# Patient Record
Sex: MALE | Race: WHITE | NOT HISPANIC OR LATINO | Employment: UNEMPLOYED | URBAN - NONMETROPOLITAN AREA
[De-identification: names, ages, dates, MRNs, and addresses within clinical notes are randomized per-mention and may not be internally consistent; named-entity substitution may affect disease eponyms.]

---

## 2020-03-10 ENCOUNTER — HOSPITAL ENCOUNTER (INPATIENT)
Facility: HOSPITAL | Age: 50
LOS: 5 days | Discharge: HOME | End: 2020-03-15
Attending: PSYCHIATRY & NEUROLOGY | Admitting: PSYCHIATRY & NEUROLOGY

## 2020-03-10 ENCOUNTER — HOSPITAL ENCOUNTER (EMERGENCY)
Facility: HOSPITAL | Age: 50
Discharge: STILL ADMITTED OR ADMINISTRATIVE | End: 2020-03-10
Attending: EMERGENCY MEDICINE

## 2020-03-10 VITALS
WEIGHT: 200 LBS | OXYGEN SATURATION: 96 % | HEIGHT: 70 IN | BODY MASS INDEX: 28.63 KG/M2 | DIASTOLIC BLOOD PRESSURE: 85 MMHG | SYSTOLIC BLOOD PRESSURE: 137 MMHG | RESPIRATION RATE: 18 BRPM | TEMPERATURE: 98.4 F | HEART RATE: 85 BPM

## 2020-03-10 DIAGNOSIS — F10.10 ALCOHOL ABUSE: Primary | ICD-10-CM

## 2020-03-10 PROBLEM — F19.20 CHEMICAL DEPENDENCY (HCC): Status: ACTIVE | Noted: 2020-03-10

## 2020-03-10 LAB
6-ACETYL MORPHINE: NEGATIVE
ALBUMIN SERPL-MCNC: 5.08 G/DL (ref 3.5–5.2)
ALBUMIN/GLOB SERPL: 1.7 G/DL
ALP SERPL-CCNC: 112 U/L (ref 39–117)
ALT SERPL W P-5'-P-CCNC: 17 U/L (ref 1–41)
AMPHET+METHAMPHET UR QL: NEGATIVE
ANION GAP SERPL CALCULATED.3IONS-SCNC: 14 MMOL/L (ref 5–15)
AST SERPL-CCNC: 20 U/L (ref 1–40)
BARBITURATES UR QL SCN: NEGATIVE
BASOPHILS # BLD AUTO: 0.07 10*3/MM3 (ref 0–0.2)
BASOPHILS NFR BLD AUTO: 0.5 % (ref 0–1.5)
BENZODIAZ UR QL SCN: NEGATIVE
BILIRUB SERPL-MCNC: 0.4 MG/DL (ref 0.2–1.2)
BILIRUB UR QL STRIP: NEGATIVE
BUN BLD-MCNC: 10 MG/DL (ref 6–20)
BUN/CREAT SERPL: 11.2 (ref 7–25)
BUPRENORPHINE SERPL-MCNC: NEGATIVE NG/ML
CALCIUM SPEC-SCNC: 9.4 MG/DL (ref 8.6–10.5)
CANNABINOIDS SERPL QL: POSITIVE
CHLORIDE SERPL-SCNC: 97 MMOL/L (ref 98–107)
CLARITY UR: CLEAR
CO2 SERPL-SCNC: 25 MMOL/L (ref 22–29)
COCAINE UR QL: NEGATIVE
COLOR UR: YELLOW
CREAT BLD-MCNC: 0.89 MG/DL (ref 0.76–1.27)
DEPRECATED RDW RBC AUTO: 45.3 FL (ref 37–54)
DEPRECATED RDW RBC AUTO: 47.3 FL (ref 37–54)
EOSINOPHIL # BLD AUTO: 0.46 10*3/MM3 (ref 0–0.4)
EOSINOPHIL # BLD MANUAL: 0.55 10*3/MM3 (ref 0–0.4)
EOSINOPHIL NFR BLD AUTO: 3 % (ref 0.3–6.2)
EOSINOPHIL NFR BLD MANUAL: 4 % (ref 0.3–6.2)
ERYTHROCYTE [DISTWIDTH] IN BLOOD BY AUTOMATED COUNT: 13.2 % (ref 12.3–15.4)
ERYTHROCYTE [DISTWIDTH] IN BLOOD BY AUTOMATED COUNT: 13.3 % (ref 12.3–15.4)
ETHANOL BLD-MCNC: <10 MG/DL (ref 0–10)
ETHANOL UR QL: <0.01 %
GFR SERPL CREATININE-BSD FRML MDRD: 90 ML/MIN/1.73
GLOBULIN UR ELPH-MCNC: 3 GM/DL
GLUCOSE BLD-MCNC: 102 MG/DL (ref 65–99)
GLUCOSE UR STRIP-MCNC: NEGATIVE MG/DL
HCT VFR BLD AUTO: 45.2 % (ref 37.5–51)
HCT VFR BLD AUTO: 46.3 % (ref 37.5–51)
HGB BLD-MCNC: 14.5 G/DL (ref 13–17.7)
HGB BLD-MCNC: 15 G/DL (ref 13–17.7)
HGB UR QL STRIP.AUTO: NEGATIVE
IMM GRANULOCYTES # BLD AUTO: 0.08 10*3/MM3 (ref 0–0.05)
IMM GRANULOCYTES NFR BLD AUTO: 0.5 % (ref 0–0.5)
KETONES UR QL STRIP: NEGATIVE
LEUKOCYTE ESTERASE UR QL STRIP.AUTO: NEGATIVE
LYMPHOCYTES # BLD AUTO: 4.4 10*3/MM3 (ref 0.7–3.1)
LYMPHOCYTES # BLD MANUAL: 4.96 10*3/MM3 (ref 0.7–3.1)
LYMPHOCYTES NFR BLD AUTO: 28.5 % (ref 19.6–45.3)
LYMPHOCYTES NFR BLD MANUAL: 36 % (ref 19.6–45.3)
LYMPHOCYTES NFR BLD MANUAL: 8 % (ref 5–12)
MAGNESIUM SERPL-MCNC: 1.9 MG/DL (ref 1.6–2.6)
MCH RBC QN AUTO: 30.1 PG (ref 26.6–33)
MCH RBC QN AUTO: 30.7 PG (ref 26.6–33)
MCHC RBC AUTO-ENTMCNC: 32.1 G/DL (ref 31.5–35.7)
MCHC RBC AUTO-ENTMCNC: 32.4 G/DL (ref 31.5–35.7)
MCV RBC AUTO: 92.8 FL (ref 79–97)
MCV RBC AUTO: 95.6 FL (ref 79–97)
METHADONE UR QL SCN: NEGATIVE
MONOCYTES # BLD AUTO: 1.07 10*3/MM3 (ref 0.1–0.9)
MONOCYTES # BLD AUTO: 1.1 10*3/MM3 (ref 0.1–0.9)
MONOCYTES NFR BLD AUTO: 6.9 % (ref 5–12)
NEUTROPHILS # BLD AUTO: 7.17 10*3/MM3 (ref 1.7–7)
NEUTROPHILS # BLD AUTO: 9.34 10*3/MM3 (ref 1.7–7)
NEUTROPHILS NFR BLD AUTO: 60.6 % (ref 42.7–76)
NEUTROPHILS NFR BLD MANUAL: 52 % (ref 42.7–76)
NITRITE UR QL STRIP: NEGATIVE
NRBC BLD AUTO-RTO: 0 /100 WBC (ref 0–0.2)
OPIATES UR QL: NEGATIVE
OXYCODONE UR QL SCN: NEGATIVE
PCP UR QL SCN: NEGATIVE
PH UR STRIP.AUTO: 6 [PH] (ref 5–8)
PLAT MORPH BLD: NORMAL
PLATELET # BLD AUTO: 281 10*3/MM3 (ref 140–450)
PLATELET # BLD AUTO: 297 10*3/MM3 (ref 140–450)
PMV BLD AUTO: 10.6 FL (ref 6–12)
PMV BLD AUTO: 10.9 FL (ref 6–12)
POTASSIUM BLD-SCNC: 3.6 MMOL/L (ref 3.5–5.2)
PROT SERPL-MCNC: 8.1 G/DL (ref 6–8.5)
PROT UR QL STRIP: NEGATIVE
RBC # BLD AUTO: 4.73 10*6/MM3 (ref 4.14–5.8)
RBC # BLD AUTO: 4.99 10*6/MM3 (ref 4.14–5.8)
RBC MORPH BLD: NORMAL
SCAN SLIDE: NORMAL
SODIUM BLD-SCNC: 136 MMOL/L (ref 136–145)
SP GR UR STRIP: <=1.005 (ref 1–1.03)
UROBILINOGEN UR QL STRIP: NORMAL
WBC NRBC COR # BLD: 13.79 10*3/MM3 (ref 3.4–10.8)
WBC NRBC COR # BLD: 15.42 10*3/MM3 (ref 3.4–10.8)

## 2020-03-10 PROCEDURE — 83735 ASSAY OF MAGNESIUM: CPT | Performed by: PHYSICIAN ASSISTANT

## 2020-03-10 PROCEDURE — 93005 ELECTROCARDIOGRAM TRACING: CPT | Performed by: PSYCHIATRY & NEUROLOGY

## 2020-03-10 PROCEDURE — 80307 DRUG TEST PRSMV CHEM ANLYZR: CPT | Performed by: PHYSICIAN ASSISTANT

## 2020-03-10 PROCEDURE — 85025 COMPLETE CBC W/AUTO DIFF WBC: CPT | Performed by: PHYSICIAN ASSISTANT

## 2020-03-10 PROCEDURE — 85007 BL SMEAR W/DIFF WBC COUNT: CPT | Performed by: PSYCHIATRY & NEUROLOGY

## 2020-03-10 PROCEDURE — 81003 URINALYSIS AUTO W/O SCOPE: CPT | Performed by: PHYSICIAN ASSISTANT

## 2020-03-10 PROCEDURE — 80053 COMPREHEN METABOLIC PANEL: CPT | Performed by: PHYSICIAN ASSISTANT

## 2020-03-10 PROCEDURE — 85025 COMPLETE CBC W/AUTO DIFF WBC: CPT | Performed by: PSYCHIATRY & NEUROLOGY

## 2020-03-10 PROCEDURE — 93010 ELECTROCARDIOGRAM REPORT: CPT | Performed by: INTERNAL MEDICINE

## 2020-03-10 PROCEDURE — HZ2ZZZZ DETOXIFICATION SERVICES FOR SUBSTANCE ABUSE TREATMENT: ICD-10-PCS | Performed by: PSYCHIATRY & NEUROLOGY

## 2020-03-10 RX ORDER — ECHINACEA PURPUREA EXTRACT 125 MG
2 TABLET ORAL AS NEEDED
Status: DISCONTINUED | OUTPATIENT
Start: 2020-03-10 | End: 2020-03-15 | Stop reason: HOSPADM

## 2020-03-10 RX ORDER — ONDANSETRON 4 MG/1
4 TABLET, FILM COATED ORAL EVERY 6 HOURS PRN
Status: DISCONTINUED | OUTPATIENT
Start: 2020-03-10 | End: 2020-03-15 | Stop reason: HOSPADM

## 2020-03-10 RX ORDER — LORAZEPAM 0.5 MG/1
0.5 TABLET ORAL EVERY 4 HOURS PRN
Status: ACTIVE | OUTPATIENT
Start: 2020-03-14 | End: 2020-03-15

## 2020-03-10 RX ORDER — LORAZEPAM 1 MG/1
1 TABLET ORAL
Status: COMPLETED | OUTPATIENT
Start: 2020-03-13 | End: 2020-03-13

## 2020-03-10 RX ORDER — RISPERIDONE 0.5 MG/1
0.5 TABLET ORAL NIGHTLY
COMMUNITY
End: 2020-03-15 | Stop reason: HOSPADM

## 2020-03-10 RX ORDER — ACETAMINOPHEN 325 MG/1
650 TABLET ORAL EVERY 6 HOURS PRN
Status: DISCONTINUED | OUTPATIENT
Start: 2020-03-10 | End: 2020-03-15 | Stop reason: HOSPADM

## 2020-03-10 RX ORDER — RAMIPRIL 10 MG/1
10 CAPSULE ORAL DAILY
Status: DISCONTINUED | OUTPATIENT
Start: 2020-03-11 | End: 2020-03-15 | Stop reason: HOSPADM

## 2020-03-10 RX ORDER — BENZTROPINE MESYLATE 1 MG/1
2 TABLET ORAL ONCE AS NEEDED
Status: DISCONTINUED | OUTPATIENT
Start: 2020-03-10 | End: 2020-03-15 | Stop reason: HOSPADM

## 2020-03-10 RX ORDER — HYDROXYZINE 50 MG/1
25 TABLET, FILM COATED ORAL 2 TIMES DAILY PRN
Status: CANCELLED | OUTPATIENT
Start: 2020-03-10

## 2020-03-10 RX ORDER — BENZTROPINE MESYLATE 1 MG/ML
1 INJECTION INTRAMUSCULAR; INTRAVENOUS ONCE AS NEEDED
Status: DISCONTINUED | OUTPATIENT
Start: 2020-03-10 | End: 2020-03-15 | Stop reason: HOSPADM

## 2020-03-10 RX ORDER — FAMOTIDINE 20 MG/1
20 TABLET, FILM COATED ORAL 2 TIMES DAILY PRN
Status: DISCONTINUED | OUTPATIENT
Start: 2020-03-10 | End: 2020-03-15 | Stop reason: HOSPADM

## 2020-03-10 RX ORDER — SERTRALINE HYDROCHLORIDE 25 MG/1
25 TABLET, FILM COATED ORAL DAILY
COMMUNITY
End: 2020-03-15 | Stop reason: HOSPADM

## 2020-03-10 RX ORDER — HYDROXYZINE 50 MG/1
50 TABLET, FILM COATED ORAL EVERY 6 HOURS PRN
Status: DISCONTINUED | OUTPATIENT
Start: 2020-03-10 | End: 2020-03-15 | Stop reason: HOSPADM

## 2020-03-10 RX ORDER — RAMIPRIL 10 MG/1
10 CAPSULE ORAL DAILY
COMMUNITY

## 2020-03-10 RX ORDER — HYDROXYZINE PAMOATE 25 MG/1
25 CAPSULE ORAL 2 TIMES DAILY PRN
COMMUNITY

## 2020-03-10 RX ORDER — SERTRALINE HYDROCHLORIDE 25 MG/1
25 TABLET, FILM COATED ORAL DAILY
Status: DISCONTINUED | OUTPATIENT
Start: 2020-03-11 | End: 2020-03-11

## 2020-03-10 RX ORDER — ALUMINA, MAGNESIA, AND SIMETHICONE 2400; 2400; 240 MG/30ML; MG/30ML; MG/30ML
15 SUSPENSION ORAL EVERY 6 HOURS PRN
Status: DISCONTINUED | OUTPATIENT
Start: 2020-03-10 | End: 2020-03-15 | Stop reason: HOSPADM

## 2020-03-10 RX ORDER — TRAZODONE HYDROCHLORIDE 50 MG/1
50 TABLET ORAL NIGHTLY PRN
Status: DISCONTINUED | OUTPATIENT
Start: 2020-03-10 | End: 2020-03-15 | Stop reason: HOSPADM

## 2020-03-10 RX ORDER — BENZONATATE 100 MG/1
100 CAPSULE ORAL 3 TIMES DAILY PRN
Status: DISCONTINUED | OUTPATIENT
Start: 2020-03-10 | End: 2020-03-15 | Stop reason: HOSPADM

## 2020-03-10 RX ORDER — IBUPROFEN 400 MG/1
400 TABLET ORAL EVERY 6 HOURS PRN
Status: DISCONTINUED | OUTPATIENT
Start: 2020-03-10 | End: 2020-03-15 | Stop reason: HOSPADM

## 2020-03-10 RX ORDER — LORAZEPAM 2 MG/1
2 TABLET ORAL
Status: COMPLETED | OUTPATIENT
Start: 2020-03-11 | End: 2020-03-11

## 2020-03-10 RX ORDER — LORAZEPAM 0.5 MG/1
0.5 TABLET ORAL
Status: COMPLETED | OUTPATIENT
Start: 2020-03-14 | End: 2020-03-14

## 2020-03-10 RX ORDER — LORAZEPAM 2 MG/1
2 TABLET ORAL EVERY 4 HOURS PRN
Status: ACTIVE | OUTPATIENT
Start: 2020-03-11 | End: 2020-03-12

## 2020-03-10 RX ORDER — LORAZEPAM 1 MG/1
1 TABLET ORAL EVERY 4 HOURS PRN
Status: ACTIVE | OUTPATIENT
Start: 2020-03-13 | End: 2020-03-14

## 2020-03-10 RX ORDER — LORAZEPAM 2 MG/1
2 TABLET ORAL
Status: ACTIVE | OUTPATIENT
Start: 2020-03-10 | End: 2020-03-11

## 2020-03-10 RX ORDER — RISPERIDONE 0.25 MG/1
0.5 TABLET ORAL NIGHTLY
Status: DISCONTINUED | OUTPATIENT
Start: 2020-03-10 | End: 2020-03-11

## 2020-03-10 RX ORDER — LOPERAMIDE HYDROCHLORIDE 2 MG/1
2 CAPSULE ORAL
Status: DISCONTINUED | OUTPATIENT
Start: 2020-03-10 | End: 2020-03-15 | Stop reason: HOSPADM

## 2020-03-10 RX ADMIN — TRAZODONE HYDROCHLORIDE 50 MG: 50 TABLET ORAL at 21:18

## 2020-03-10 RX ADMIN — RISPERIDONE 0.5 MG: 0.25 TABLET, FILM COATED ORAL at 21:18

## 2020-03-10 RX ADMIN — HYDROXYZINE HYDROCHLORIDE 50 MG: 50 TABLET ORAL at 21:18

## 2020-03-10 NOTE — ED PROVIDER NOTES
Subjective   49-year-old male presents secondary to need for detox from alcohol.  Patient states that he was previously drinking heavily.  He states that recently has not been able to afford to drink very often and drinks 6 beers a day when he can get them.  He is never had any withdrawal signs or symptoms.  No hallucinations or seizures.  States he has had 1 beer today.  He also smokes marijuana from time to time.  He denies any suicidal homicidal ideation.  No medical complaints.  No other complaints at this time.          Review of Systems   Constitutional: Negative.  Negative for fever.   HENT: Negative.    Respiratory: Negative.    Cardiovascular: Negative.  Negative for chest pain.   Gastrointestinal: Negative.  Negative for abdominal pain.   Endocrine: Negative.    Genitourinary: Negative.  Negative for dysuria.   Skin: Negative.    Neurological: Negative.    Psychiatric/Behavioral: Negative.    All other systems reviewed and are negative.      Past Medical History:   Diagnosis Date   • Anxiety    • Depression    • Hypertension        No Known Allergies    No past surgical history on file.    History reviewed. No pertinent family history.    Social History     Socioeconomic History   • Marital status: Single     Spouse name: Not on file   • Number of children: Not on file   • Years of education: Not on file   • Highest education level: Not on file   Tobacco Use   • Smoking status: Current Every Day Smoker     Packs/day: 2.00     Years: 40.00     Pack years: 80.00     Types: Cigarettes   Substance and Sexual Activity   • Alcohol use: Yes     Alcohol/week: 12.0 - 30.0 standard drinks     Types: 12 - 30 Cans of beer per week   • Drug use: Yes     Types: Marijuana   • Sexual activity: Not Currently     Partners: Female           Objective   Physical Exam   Constitutional: He is oriented to person, place, and time. He appears well-developed and well-nourished. No distress.   HENT:   Head: Normocephalic and  atraumatic.   Right Ear: External ear normal.   Left Ear: External ear normal.   Nose: Nose normal.   Eyes: Pupils are equal, round, and reactive to light. Conjunctivae and EOM are normal.   Neck: Normal range of motion. Neck supple. No JVD present. No tracheal deviation present.   Cardiovascular: Normal rate, regular rhythm and normal heart sounds.   No murmur heard.  Pulmonary/Chest: Effort normal and breath sounds normal. No respiratory distress. He has no wheezes.   Abdominal: Soft. Bowel sounds are normal. There is no tenderness.   Musculoskeletal: Normal range of motion. He exhibits no edema or deformity.   Neurological: He is alert and oriented to person, place, and time. No cranial nerve deficit.   Skin: Skin is warm and dry. No rash noted. He is not diaphoretic. No erythema. No pallor.   Psychiatric: He has a normal mood and affect. His behavior is normal. Thought content normal.   Nursing note and vitals reviewed.      Procedures           ED Course                                           MDM  Number of Diagnoses or Management Options  Alcohol abuse: new and requires workup     Amount and/or Complexity of Data Reviewed  Tests in the radiology section of CPT®: ordered and reviewed  Discuss the patient with other providers: yes    Risk of Complications, Morbidity, and/or Mortality  Presenting problems: moderate        Final diagnoses:   Alcohol abuse            Kaden Peterson PA  03/10/20 [id number]

## 2020-03-10 NOTE — NURSING NOTE
Full intake assessment completed awaiting Lab results.Full intake assessment completed awaiting Lab results.Nina presented to ED with a desire to detox from alcohol. Patient reported drinking  6-30 beers . Patient started drinking at 28  Problem at 43. Sobriety for 12 months from the time he started been drinking at this rate for 2-3 year patient CIWA 12. Patient rated depression 6/10 and anxiety 8/10. Patient reported stressors as Life. Patient reported poor sleep and appetite. Patient denies AVH.

## 2020-03-10 NOTE — NURSING NOTE
Called and provided all intake information including  abnormal lab and medical information to Dr Wood.Admit orders received.RBVOx2. Patient and ED provider aware.

## 2020-03-10 NOTE — NURSING NOTE
Patient offered food and drink as well as a bathroom. Patient safety maintained in accordance with Hospital policy.

## 2020-03-11 PROBLEM — I10 HYPERTENSION: Status: ACTIVE | Noted: 2020-03-11

## 2020-03-11 PROBLEM — F10.20 ALCOHOL USE DISORDER, SEVERE, DEPENDENCE (HCC): Status: ACTIVE | Noted: 2020-03-10

## 2020-03-11 PROBLEM — F41.9 ANXIETY: Status: ACTIVE | Noted: 2020-03-11

## 2020-03-11 PROBLEM — G47.00 INSOMNIA: Status: ACTIVE | Noted: 2020-03-11

## 2020-03-11 PROBLEM — F32.A DEPRESSION: Status: ACTIVE | Noted: 2020-03-11

## 2020-03-11 PROCEDURE — 99223 1ST HOSP IP/OBS HIGH 75: CPT | Performed by: PSYCHIATRY & NEUROLOGY

## 2020-03-11 RX ADMIN — SERTRALINE 25 MG: 25 TABLET, FILM COATED ORAL at 08:28

## 2020-03-11 RX ADMIN — LORAZEPAM 2 MG: 2 TABLET ORAL at 15:10

## 2020-03-11 RX ADMIN — HYDROXYZINE HYDROCHLORIDE 50 MG: 50 TABLET ORAL at 21:21

## 2020-03-11 RX ADMIN — LORAZEPAM 2 MG: 2 TABLET ORAL at 21:21

## 2020-03-11 RX ADMIN — RAMIPRIL 10 MG: 10 CAPSULE ORAL at 08:28

## 2020-03-11 RX ADMIN — TRAZODONE HYDROCHLORIDE 50 MG: 50 TABLET ORAL at 21:21

## 2020-03-11 RX ADMIN — LORAZEPAM 2 MG: 2 TABLET ORAL at 08:28
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
## 2020-03-11 NOTE — PLAN OF CARE
Problem: Patient Care Overview  Goal: Plan of Care Review  Outcome: Ongoing (interventions implemented as appropriate)  Flowsheets (Taken 3/11/2020 1200)  Progress: improving  Plan of Care Reviewed With: grandchild(deisi)  Patient Agreement with Plan of Care: agrees  Note:   Patient denies suicidal or homicidal ideation . Patient denies hallucination. No delusional content noted. Patient reports withdrawal symptoms have improved. He says he plans to do to rehab facility at discharge.

## 2020-03-11 NOTE — PLAN OF CARE
Problem: Patient Care Overview  Goal: Plan of Care Review  Outcome: Ongoing (interventions implemented as appropriate)  Flowsheets (Taken 3/11/2020 6642)  Progress: improving  Patient Agreement with Plan of Care: agrees  Note:   PT new admit for pm shift. Slept well, participated in group, and appetite is good.

## 2020-03-11 NOTE — PROGRESS NOTES
Review of Systems   Constitutional: Negative.    HENT: Negative.    Eyes: Negative.    Respiratory: Negative.    Cardiovascular: Negative.    Gastrointestinal: Negative.    Endocrine: Negative.    Genitourinary: Negative.    Musculoskeletal: Negative.    Skin: Negative.    Allergic/Immunologic: Negative.    Neurological: Negative.    Hematological: Negative.    Psychiatric/Behavioral: Negative.        Physical Exam   Constitutional: oriented to person, place, and time. Appears well-developed and well-nourished.   HENT:   Head: Normocephalic and atraumatic.   Right Ear: External ear normal.   Left Ear: External ear normal.   Mouth/Throat: Oropharynx is clear and moist.   Eyes: Pupils are equal, round, and reactive to light. Conjunctivae and EOM are normal.   Neck: Normal range of motion. Neck supple.   Cardiovascular: Normal rate, regular rhythm and normal heart sounds.    Pulmonary/Chest: Effort normal and breath sounds normal. No respiratory distress. No wheezes.   Abdominal: Soft. Bowel sounds are normal.No distension. There is no tenderness.   Musculoskeletal: Normal range of motion. No edema or deformity.   Neurological:Alert and oriented to person, place, and time. No cranial nerve deficit. Coordination normal.   Skin: Skin is warm and dry. No rash noted. No erythema.     DATA  Labs reviewed  EKG reviewed  SCOTT reviewed  Record reviewed    DX    Alcohol use disorder, severe, dependence (CMS/HCC)  - Ativan detox      Depression  - Increase Zoloft      Anxiety  - Increase Zoloft      Insomnia  - Stop Risperdal  - Start Trazodone      Hypertension  - Ramipril      Tobacco use disorder  - Encouraged patient to quit

## 2020-03-11 NOTE — PLAN OF CARE
Problem: Patient Care Overview  Goal: Plan of Care Review  Flowsheets (Taken 3/11/2020 1437)  Progress: no change  Plan of Care Reviewed With: patient  Patient Agreement with Plan of Care: agrees  Outcome Summary: Completed initial assessment, discussed alternative aftercare resources and expectations of treatment; reviewed treatment plan.     Problem: Patient Care Overview  Goal: Individualization and Mutuality  Flowsheets (Taken 3/11/2020 1437)  Patient Personal Strengths: expressive of needs; expressive of emotions; interests/hobbies; resilient; resourceful; motivated for treatment  Patient Vulnerabilities: Ineffective coping skills, poor insight.  Patient Specific Goals (Include Timeframe): Patient to identify 3 relapse triggers, 3 healthy coping skills, complete relapse prevention plan, and complete detox regime.  Patient Specific Interventions: Patient to access psychiatric evaluation, medication management, individual and group CBT therapy sesions during admission.  What Information Would Help Us Give You More Personalized Care?: None  What Anxieties, Fears, Concerns, or Questions Do You Have About Your Care?: None  Patient Specific Preferences: Detox regime.     Problem: Patient Care Overview  Goal: Discharge Needs Assessment  Flowsheets (Taken 3/11/2020 1437)  Outpatient/Agency/Support Group Needs: residential services  Discharge Coordination/Progress: Patient anticipated to attend [facility] upon discharge.  He has insurance for medications.  Transportation Anticipated: public transportation; agency  Anticipated Discharge Disposition: home or self-care  Transportation Concerns: car, none  Current Discharge Risk: psychiatric illness; substance use/abuse  Concerns to be Addressed: medication; mental health; coping/stress; decision making; discharge planning; substance/tobacco abuse/use; financial/insurance  Readmission Within the Last 30 Days: no previous admission in last 30 days  Patient/Family  Anticipated Services at Transition: rehabilitation services  Patient's Choice of Community Agency(s): Anticipate [facility] referral.  Patient/Family Anticipates Transition to: inpatient rehabilitation facility     Problem: Patient Care Overview  Goal: Interprofessional Rounds/Family Conf  Flowsheets (Taken 3/11/2020 1437)  Participants: psychiatrist; nursing; social work; milieu/psych techs; other (see comments) (Navigator)  Summary: Treatment team staffing.     Problem: Overarching Goals (Adult)  Goal: Optimized Coping Skills in Response to Life Stressors  Intervention: Promote Effective Coping Strategies  Flowsheets (Taken 3/11/2020 1437)  Supportive Measures: active listening utilized; counseling provided; problem solving facilitated; verbalization of feelings encouraged     Problem: Overarching Goals (Adult)  Goal: Develops/Participates in Therapeutic [place] to Support Successful Transition  Intervention: Foster Therapeutic [place]  Flowsheets (Taken 3/11/2020 1437)  Trust Relationship/Rapport: care explained; choices provided; emotional support provided; questions answered; empathic listening provided; questions encouraged; reassurance provided; thoughts/feelings acknowledged     Problem: Overarching Goals (Adult)  Goal: Develops/Participates in Therapeutic [place] to Support Successful Transition  Intervention: Mutually Develop Transition Plan  Flowsheets (Taken 3/11/2020 1437)  Transition Support: community resources reviewed; crisis management plan promoted; follow-up care discussed     Problem: Impaired Control (Excessive Substance Use) (Adult)  Intervention: Promote Optimal Psychological Functioning  Flowsheets (Taken 3/11/2020 1437)  Mutually Determined Action Steps (Promote Optimal Psychological Functioning): discusses ongoing recovery plan; identifies support resources; identifies past trauma episode  Trust Relationship/Rapport: care explained; choices provided; emotional support provided;  "questions answered; empathic listening provided; questions encouraged; reassurance provided; thoughts/feelings acknowledged       DATA:         Therapist met individually with patient this date to introduce role and to discuss hospitalization expectations. Patient agreeable.      Rufino is a 49 year old  male [place].  He presents as voluntary admit with request to detox from alcohol.  He was referred to [facility] by [facility] recently, however he was declined for admission due to doctor's appointment.  He reports recently completing 24 days of rehab at [facility] and being discharged because he \"blew 2.5 on the breathalyzer\" and denies using any alcohol.  Patient is homeless for past year.  He reports he has been drinking up to 30 beers per day and drinking until he blacked out prior to attend rehab.  He reports never having withdrawal symptoms.  Patient reports only having headache and body aches.  He is admitted for detox regime and plans to attend [facility] upon discharge.      Patient consented for [facility].  Therapist sent referral and they report planning to accept patient on Monday.     Clinical Maneuvering/Intervention:     Therapist assisted patient in processing above session content; acknowledged and normalized patient’s thoughts, feelings, and concerns.  Discussed the therapist/patient relationship and explain the parameters and limitations of relative confidentiality.  Also discussed the importance of active participation, and honesty to the treatment process.  Encouraged the patient to discuss/vent their feelings, frustrations, and fears concerning their ongoing medical issues and validated their feelings.     Discussed the importance of finding enjoyable activities and coping skills that the patient can engage in a regular basis. Discussed healthy coping skills such as distraction, self love, grounding, thought challenges/reframing, etc.  Provided patient with " list of healthy coping skills this date. Discussed the importance of medication compliance.  Praised the patient for seeking help and spent the majority of the session building rapport.       Allowed patient to freely discuss issues without interruption or judgment. Provided safe, confidential environment to facilitate the development of positive therapeutic relationship and encourage open, honest communication.      Therapist addressed discharge safety planning this date. Assisted patient in identifying risk factors which would indicate the need for higher level of care after discharge;  including thoughts to harm self or others and/or self-harming behavior. Encouraged patient to call 911, or present to the nearest emergency room should any of these events occur. Discussed crisis intervention services and means to access.  Encouraged securing any objects of harm.       Therapist completed integrated summary, treatment plan, and initiated social history this date.  Therapist is strongly encouraging family involvement in treatment.       ASSESSMENT:      The patient displayed appropriate affect and anxious mood.  He denied SI, HI, and AVH.  Patient oriented x3 with poor insight and poor judgement.  He reported feeling fatigued.     PLAN:       Patient to remain hospitalized this date.     Treatment team will focus efforts on stabilizing patient's acute symptoms while providing education on healthy coping and crisis management to reduce hospitalizations.   Patient requires daily psychiatrist evaluation and 24/7 nursing supervision to promote patient  safety.     Therapist will offer 1-4 individual sessions, 1 therapy group daily, family education, and appropriate referral.    Therapist recommends rehab referral and patient consented for [facility], who state they accept patient on Monday.

## 2020-03-12 PROCEDURE — 99232 SBSQ HOSP IP/OBS MODERATE 35: CPT | Performed by: PSYCHIATRY & NEUROLOGY

## 2020-03-12 RX ADMIN — HYDROXYZINE HYDROCHLORIDE 50 MG: 50 TABLET ORAL at 21:17

## 2020-03-12 RX ADMIN — LORAZEPAM 1.5 MG: 1 TABLET ORAL at 15:37

## 2020-03-12 RX ADMIN — HYDROXYZINE HYDROCHLORIDE 50 MG: 50 TABLET ORAL at 08:50

## 2020-03-12 RX ADMIN — RAMIPRIL 10 MG: 10 CAPSULE ORAL at 08:47

## 2020-03-12 RX ADMIN — TRAZODONE HYDROCHLORIDE 50 MG: 50 TABLET ORAL at 21:17

## 2020-03-12 RX ADMIN — SERTRALINE 50 MG: 50 TABLET, FILM COATED ORAL at 08:47

## 2020-03-12 RX ADMIN — LORAZEPAM 1.5 MG: 1 TABLET ORAL at 21:17

## 2020-03-12 RX ADMIN — LORAZEPAM 1.5 MG: 1 TABLET ORAL at 08:48

## 2020-03-12 NOTE — DISCHARGE INSTR - APPOINTMENTS
[address] [facility] [place] [address]  [phone]    You are accepted on Monday, 03/16/20, for rehab.                                                                                                                                                                                                                                                                                                                                                                                                                                                                                                                                                                                                                                                                        Breanna Saucedo will be here for  today.

## 2020-03-12 NOTE — PLAN OF CARE
Problem: Patient Care Overview  Goal: Plan of Care Review  Outcome: Ongoing (interventions implemented as appropriate)  Flowsheets (Taken 3/12/2020 1902)  Progress: improving  Plan of Care Reviewed With: patient  Patient Agreement with Plan of Care: agrees

## 2020-03-12 NOTE — PROGRESS NOTES
"INPATIENT PSYCHIATRIC PROGRESS NOTE    Name:  Rufino Sim  :  1970  MRN:  [id number]  Visit Number:  [id number]  Length of stay:  2    SUBJECTIVE  CC/Focus of Exam: alcohol use    INTERVAL HISTORY:  The patient states he is feeling better and is not experiencing any cravings or withdrawals.   Depression rating 2/10  Anxiety rating 2/10  Sleep:good  Withdrawal sx: denies  Cravin/10    Review of Systems   Respiratory: Negative.    Cardiovascular: Negative.    Gastrointestinal: Negative.    Musculoskeletal: Negative.        OBJECTIVE    Temp:  [97.4 °F (36.3 °C)-98.1 °F (36.7 °C)] 97.5 °F (36.4 °C)  Heart Rate:  [63-87] 83  Resp:  [16-24] 24  BP: (104-150)/(60-83) 143/83    MENTAL STATUS EXAM:  Appearance:Casually dressed, good hygeine.   Cooperation:Cooperative  Psychomotor: No psychomotor agitation/retardation, No EPS, No motor tics  Speech-normal rate, amount.  Mood \"good\"   Affect-congruent, appropriate, stable  Thought Content-goal directed, no delusional material present  Thought process-linear, organized.  Suicidality: No SI  Homicidality: No HI  Perception: No AH/VH  Insight-fair   Judgement-fair    Lab Results (last 24 hours)     ** No results found for the last 24 hours. **             Imaging Results (Last 24 Hours)     ** No results found for the last 24 hours. **             ECG/EMG Results (most recent)     Procedure Component Value Units Date/Time    ECG 12 Lead [[id number]] Collected:  03/10/20 1943     Updated:  20    Narrative:       Test Reason : Baseline Cardiac Status  Blood Pressure : **/** mmHG  Vent. Rate : 061 BPM     Atrial Rate : 061 BPM     P-R Int : 148 ms          QRS Dur : 096 ms      QT Int : 402 ms       P-R-T Axes : 064 030 061 degrees     QTc Int : 404 ms    Normal sinus rhythm  Normal ECG  No previous ECGs available  Confirmed by Uriel Steward () on 3/12/2020 8:33:14 AM    Referred By:  LAITH           Confirmed By:Uriel Steward       "     ALLERGIES: Patient has no known allergies.      Current Facility-Administered Medications:   •  acetaminophen (TYLENOL) tablet 650 mg, 650 mg, Oral, Q6H PRN, Shonna Wood MD  •  aluminum-magnesium hydroxide-simethicone (MAALOX MAX) 400-400-40 MG/5ML suspension 15 mL, 15 mL, Oral, Q6H PRN, Shonna Wood MD  •  benzonatate (TESSALON) capsule 100 mg, 100 mg, Oral, TID PRN, Shonna Wood MD  •  benztropine (COGENTIN) tablet 2 mg, 2 mg, Oral, Once PRN **OR** benztropine (COGENTIN) injection 1 mg, 1 mg, Intramuscular, Once PRN, Shonna Wood MD  •  famotidine (PEPCID) tablet 20 mg, 20 mg, Oral, BID PRN, Shonna Wood MD  •  hydrOXYzine (ATARAX) tablet 50 mg, 50 mg, Oral, Q6H PRN, Shonna Wood MD, 50 mg at 20 0850  •  ibuprofen (ADVIL,MOTRIN) tablet 400 mg, 400 mg, Oral, Q6H PRN, Shonna Wood MD  •  loperamide (IMODIUM) capsule 2 mg, 2 mg, Oral, Q2H PRN, Shonna Wood MD  •  [COMPLETED] LORazepam (ATIVAN) tablet 2 mg, 2 mg, Oral, 3 times per day, 2 mg at 20 2121 **FOLLOWED BY** LORazepam (ATIVAN) tablet 1.5 mg, 1.5 mg, Oral, 3 times per day, 1.5 mg at 20 0848 **FOLLOWED BY** [START ON 3/13/2020] LORazepam (ATIVAN) tablet 1 mg, 1 mg, Oral, 3 times per day **FOLLOWED BY** [START ON 3/14/2020] LORazepam (ATIVAN) tablet 0.5 mg, 0.5 mg, Oral, 3 times per day, Shonna Wood MD  •  [] LORazepam (ATIVAN) tablet 2 mg, 2 mg, Oral, Q4H PRN **FOLLOWED BY** LORazepam (ATIVAN) tablet 1.5 mg, 1.5 mg, Oral, Q4H PRN **FOLLOWED BY** [START ON 3/13/2020] LORazepam (ATIVAN) tablet 1 mg, 1 mg, Oral, Q4H PRN **FOLLOWED BY** [START ON 3/14/2020] LORazepam (ATIVAN) tablet 0.5 mg, 0.5 mg, Oral, Q4H PRN, Shonna Wood MD  •  magnesium hydroxide (MILK OF MAGNESIA) suspension 2400 mg/10mL 10 mL, 10 mL, Oral, Daily PRN, Shonna Wood MD  •  ondansetron (ZOFRAN) tablet 4 mg, 4 mg, Oral, Q6H PRN, Shonna Wood MD  •  ramipril (ALTACE) capsule 10 mg, 10 mg, Oral, Daily, Shonna Wood MD, 10 mg at 20  0847  •  sertraline (ZOLOFT) tablet 50 mg, 50 mg, Oral, Daily, Shonna Wood MD, 50 mg at 03/12/20 0847  •  sodium chloride nasal spray 2 spray, 2 spray, Each Nare, PRN, Shonna Wood MD  •  traZODone (DESYREL) tablet 50 mg, 50 mg, Oral, Nightly PRN, Shonna Wood MD, 50 mg at 03/11/20 2121    ASSESSMENT & PLAN:      Alcohol use disorder, severe, dependence (CMS/HCC)  - Continue Ativan detox      Depression  - Continue Zoloft      Anxiety  - Continue Zoloft      Insomnia  - Continue trazodone      Hypertension  - Ramipril      Tobacco use disorder  - Encouraged patient to quit tobacco    Special precautions: Special Precautions Level 4 (q30 min checks).    Behavioral Health Treatment Plan and Problem List: I have reviewed and approved the Behavioral Health Treatment Plan and Problem list.  The patient has had a chance to review and agrees with the treatment plan.     Clinician:  Shonna Wood MD  03/12/20  12:47

## 2020-03-12 NOTE — PLAN OF CARE
Problem: Patient Care Overview  Goal: Interprofessional Rounds/Family Conf  Flowsheets (Taken 3/12/2020 8869)  Participants: milieu/psych techs; nursing; social work  Summary: Treatment team staffing.     Therapist reviewed patient's chart and staffed with treatment team.  Met with patient for individual at bed side to discuss progress and address concerns.  Patient denied cravings and denied withdrawals.  He denied SI, HI, and AVH.  Patient oriented x3 with fair insight.  He reported feeling a little anxious.  Patient agreeable for [facility], who accepted patient for Monday.

## 2020-03-12 NOTE — PLAN OF CARE
Problem: Patient Care Overview  Goal: Plan of Care Review  Outcome: Ongoing (interventions implemented as appropriate)  Flowsheets (Taken 3/12/2020 0512)  Progress: improving  Plan of Care Reviewed With: patient  Patient Agreement with Plan of Care: agrees  Note:   Pt alert, oriented to person, place, time; reports good appetite and sleep; rates anxiety 4, depression 5; feelings of hopelessness, helplessness, worthlessness, powerlessness; rates cravings 3; denies w/d symptoms; pt participating in group; pt resting well throughout the night.

## 2020-03-13 PROCEDURE — 99232 SBSQ HOSP IP/OBS MODERATE 35: CPT | Performed by: PSYCHIATRY & NEUROLOGY

## 2020-03-13 RX ADMIN — LORAZEPAM 1 MG: 1 TABLET ORAL at 21:23

## 2020-03-13 RX ADMIN — SERTRALINE 50 MG: 50 TABLET, FILM COATED ORAL at 08:19

## 2020-03-13 RX ADMIN — RAMIPRIL 10 MG: 10 CAPSULE ORAL at 08:19

## 2020-03-13 RX ADMIN — LORAZEPAM 1 MG: 1 TABLET ORAL at 08:20

## 2020-03-13 RX ADMIN — TRAZODONE HYDROCHLORIDE 50 MG: 50 TABLET ORAL at 21:05

## 2020-03-13 RX ADMIN — LORAZEPAM 1 MG: 1 TABLET ORAL at 21:05

## 2020-03-13 RX ADMIN — LORAZEPAM 1 MG: 1 TABLET ORAL at 14:22

## 2020-03-13 RX ADMIN — HYDROXYZINE HYDROCHLORIDE 50 MG: 50 TABLET ORAL at 21:12

## 2020-03-13 RX ADMIN — HYDROXYZINE HYDROCHLORIDE 50 MG: 50 TABLET ORAL at 11:49

## 2020-03-13 NOTE — PLAN OF CARE
Problem: Patient Care Overview  Goal: Interprofessional Rounds/Family Conf  Flowsheets (Taken 3/13/2020 9922)  Participants: milieu/psych techs; nursing; social work  Summary: Staffed with RN and MHT.     DATA:         Therapist met individually with patient this date for inpatient follow up.  Continued to discuss progress with treatment.  Therapist reviewed patient's chart and provided education on resources for aftercare.  Discussed disposition planning.    Patient discussed feeling okay today and denied withdrawal symptoms other than fatigue and feeling tired.  He reported feeling anxious and depressed some.  He reported minor alcohol craving.  Patient remains isolated to room.  He discussed lack of resources and being homeless as negative consequences of alcohol abuse.  Patient remains agreeable for [facility] upon discharge and accepted for rehab bed on Monday.  He declined family involvement.  Patient continues hospitalization.     Clinical Maneuvering/Intervention:     Therapist assisted patient in processing above session content; acknowledged and normalized patient’s thoughts, feelings, and concerns.  Discussed the therapist/patient relationship and explain the parameters and limitations of relative confidentiality.  Also discussed the importance active participation, and honesty to the treatment process.  Encouraged the patient to discuss/vent her feelings, frustrations, and fears concerning ongoing medical issues and validated patient's feelings.     Discussed the importance of finding enjoyable activities and coping skills that the patient can engage in a regular basis. Discussed healthy coping skills such as distraction, self love, grounding, thought challenges/reframing, etc.  Provided patient with list of healthy coping skills this date. Discussed the importance of medication compliance.       Allowed patient to freely discuss issues without interruption or judgment. Provided safe, confidential  environment to facilitate the development of positive therapeutic relationship and encourage open, honest communication.       ASSESSMENT:      Patient appeared to display restricted affect and anxious mood.  He denied SI, HI, and AVH.  Patient oriented x3 with poor insight.  He reported fatigue.     PLAN:       Patient to remain hospitalized this date.  [facility]; they will transport upon discharge.

## 2020-03-13 NOTE — PLAN OF CARE
Problem: Patient Care Overview  Goal: Plan of Care Review  3/13/2020 0634 by Dona Sky RN  Outcome: Ongoing (interventions implemented as appropriate)  Flowsheets (Taken 3/13/2020 0634)  Progress: improving  Plan of Care Reviewed With: patient  Patient Agreement with Plan of Care: agrees  3/13/2020 0617 by Dona Sky RN  Outcome: Ongoing (interventions implemented as appropriate)  Flowsheets (Taken 3/13/2020 0617)  Progress: improving  Plan of Care Reviewed With: patient  Patient Agreement with Plan of Care: agrees

## 2020-03-13 NOTE — PLAN OF CARE
Problem: Patient Care Overview  Goal: Plan of Care Review  Outcome: Ongoing (interventions implemented as appropriate)  Flowsheets (Taken 3/13/2020 6406)  Progress: improving  Plan of Care Reviewed With: patient  Patient Agreement with Plan of Care: agrees

## 2020-03-13 NOTE — PROGRESS NOTES
"INPATIENT PSYCHIATRIC PROGRESS NOTE    Name:  Rufino Sim  :  1970  MRN:  [id number]  Visit Number:  [id number]  Length of stay:  3    SUBJECTIVE  CC/Focus of Exam: alcohol use    INTERVAL HISTORY:  The patient reports he is feeling better and the detox treatment his helping and he is not experiencing any cravings or withdrawals.  Depression rating 5/10  Anxiety rating 5/10  Sleep: not so good  Withdrawal sx: denies  Cravin/10    Review of Systems   Respiratory: Negative.    Cardiovascular: Negative.    Gastrointestinal: Negative.        OBJECTIVE    Temp:  [97 °F (36.1 °C)-98.4 °F (36.9 °C)] 97 °F (36.1 °C)  Heart Rate:  [] 74  Resp:  [18-20] 18  BP: (101-132)/(58-82) 125/79    MENTAL STATUS EXAM:  Appearance:Casually dressed, good hygeine.   Cooperation:Cooperative  Psychomotor: No psychomotor agitation/retardation, No EPS, No motor tics  Speech-normal rate, amount.  Mood \"a bit depressed\"   Affect-congruent, appropriate, stable  Thought Content-goal directed, no delusional material present  Thought process-linear, organized.  Suicidality: No SI  Homicidality: No HI  Perception: No AH/VH  Insight-fair   Judgement-fair    Lab Results (last 24 hours)     ** No results found for the last 24 hours. **             Imaging Results (Last 24 Hours)     ** No results found for the last 24 hours. **             ECG/EMG Results (most recent)     Procedure Component Value Units Date/Time    ECG 12 Lead [[id number]] Collected:  03/10/20 1943     Updated:  20    Narrative:       Test Reason : Baseline Cardiac Status  Blood Pressure : **/** mmHG  Vent. Rate : 061 BPM     Atrial Rate : 061 BPM     P-R Int : 148 ms          QRS Dur : 096 ms      QT Int : 402 ms       P-R-T Axes : 064 030 061 degrees     QTc Int : 404 ms    Normal sinus rhythm  Normal ECG  No previous ECGs available  Confirmed by Uriel Steward () on 3/12/2020 8:33:14 AM    Referred By:  LAITH           Confirmed By:Uriel " Subramaniyam           ALLERGIES: Patient has no known allergies.      Current Facility-Administered Medications:   •  acetaminophen (TYLENOL) tablet 650 mg, 650 mg, Oral, Q6H PRN, Shonna Wood MD  •  aluminum-magnesium hydroxide-simethicone (MAALOX MAX) 400-400-40 MG/5ML suspension 15 mL, 15 mL, Oral, Q6H PRN, Shonna Wood MD  •  benzonatate (TESSALON) capsule 100 mg, 100 mg, Oral, TID PRN, Shonna Wood MD  •  benztropine (COGENTIN) tablet 2 mg, 2 mg, Oral, Once PRN **OR** benztropine (COGENTIN) injection 1 mg, 1 mg, Intramuscular, Once PRN, Shonna Wood MD  •  famotidine (PEPCID) tablet 20 mg, 20 mg, Oral, BID PRN, Shonna Wood MD  •  hydrOXYzine (ATARAX) tablet 50 mg, 50 mg, Oral, Q6H PRN, Shonna Wood MD, 50 mg at 20  •  ibuprofen (ADVIL,MOTRIN) tablet 400 mg, 400 mg, Oral, Q6H PRN, Shonna Wood MD  •  loperamide (IMODIUM) capsule 2 mg, 2 mg, Oral, Q2H PRN, Shonna Wood MD  •  [COMPLETED] LORazepam (ATIVAN) tablet 2 mg, 2 mg, Oral, 3 times per day, 2 mg at 20 **FOLLOWED BY** [COMPLETED] LORazepam (ATIVAN) tablet 1.5 mg, 1.5 mg, Oral, 3 times per day, 1.5 mg at 20 **FOLLOWED BY** LORazepam (ATIVAN) tablet 1 mg, 1 mg, Oral, 3 times per day, 1 mg at 20 **FOLLOWED BY** [START ON 3/14/2020] LORazepam (ATIVAN) tablet 0.5 mg, 0.5 mg, Oral, 3 times per day, Shonna Wood MD  •  [] LORazepam (ATIVAN) tablet 2 mg, 2 mg, Oral, Q4H PRN **FOLLOWED BY** [] LORazepam (ATIVAN) tablet 1.5 mg, 1.5 mg, Oral, Q4H PRN **FOLLOWED BY** LORazepam (ATIVAN) tablet 1 mg, 1 mg, Oral, Q4H PRN **FOLLOWED BY** [START ON 3/14/2020] LORazepam (ATIVAN) tablet 0.5 mg, 0.5 mg, Oral, Q4H PRN, Shonna Wood MD  •  magnesium hydroxide (MILK OF MAGNESIA) suspension 2400 mg/10mL 10 mL, 10 mL, Oral, Daily PRN, Shonna Wood MD  •  ondansetron (ZOFRAN) tablet 4 mg, 4 mg, Oral, Q6H PRN, Shonna Wood MD  •  ramipril (ALTACE) capsule 10 mg, 10 mg, Oral, Daily, Shonna Wood MD,  10 mg at 03/13/20 0819  •  sertraline (ZOLOFT) tablet 50 mg, 50 mg, Oral, Daily, Shonna Wood MD, 50 mg at 03/13/20 0819  •  sodium chloride nasal spray 2 spray, 2 spray, Each Nare, PRN, Shonna Wood MD  •  traZODone (DESYREL) tablet 50 mg, 50 mg, Oral, Nightly PRN, Shonna Wood MD, 50 mg at 03/12/20 2117    ASSESSMENT & PLAN:      Alcohol use disorder, severe, dependence (CMS/HCC)  - Continue Ativan detox      Depression  - Increase Zoloft      Anxiety  - Increase Zoloft      Insomnia  - Continue trazodone      Hypertension  - Continue Ramipril    Special precautions: Special Precautions Level 4 (q30 min checks).    Behavioral Health Treatment Plan and Problem List: I have reviewed and approved the Behavioral Health Treatment Plan and Problem list.  The patient has had a chance to review and agrees with the treatment plan.     Clinician:  Shonna Wood MD  03/13/20  10:33

## 2020-03-14 PROCEDURE — 99232 SBSQ HOSP IP/OBS MODERATE 35: CPT | Performed by: PSYCHIATRY & NEUROLOGY

## 2020-03-14 RX ADMIN — HYDROXYZINE HYDROCHLORIDE 50 MG: 50 TABLET ORAL at 14:45

## 2020-03-14 RX ADMIN — SERTRALINE 100 MG: 50 TABLET, FILM COATED ORAL at 08:04

## 2020-03-14 RX ADMIN — LORAZEPAM 0.5 MG: 0.5 TABLET ORAL at 08:06

## 2020-03-14 RX ADMIN — HYDROXYZINE HYDROCHLORIDE 50 MG: 50 TABLET ORAL at 21:01

## 2020-03-14 RX ADMIN — LORAZEPAM 0.5 MG: 0.5 TABLET ORAL at 14:45

## 2020-03-14 RX ADMIN — RAMIPRIL 10 MG: 10 CAPSULE ORAL at 08:04

## 2020-03-14 RX ADMIN — LORAZEPAM 0.5 MG: 0.5 TABLET ORAL at 21:01

## 2020-03-14 RX ADMIN — HYDROXYZINE HYDROCHLORIDE 50 MG: 50 TABLET ORAL at 08:05

## 2020-03-14 RX ADMIN — TRAZODONE HYDROCHLORIDE 50 MG: 50 TABLET ORAL at 21:01

## 2020-03-14 NOTE — PLAN OF CARE
Problem: Patient Care Overview  Goal: Plan of Care Review  Outcome: Ongoing (interventions implemented as appropriate)  Flowsheets (Taken 3/14/2020 0309)  Progress: improving  Plan of Care Reviewed With: patient  Patient Agreement with Plan of Care: agrees

## 2020-03-14 NOTE — PROGRESS NOTES
"INPATIENT PSYCHIATRIC PROGRESS NOTE    Name:  Rufino Sim  :  1970  MRN:  [id number]  Visit Number:  [id number]  Length of stay:  4    SUBJECTIVE  CC/Focus of Exam: alcohol use    INTERVAL HISTORY:  The patient reports he is feeling good and reports no cravings or withdrawals.  Depression rating 5/10  Anxiety rating 5/10  Sleep: intermittent  Withdrawal sx: denies  Cravin/10    Review of Systems   Respiratory: Negative.    Cardiovascular: Negative.    Gastrointestinal: Negative.        OBJECTIVE    Temp:  [97 °F (36.1 °C)-98.5 °F (36.9 °C)] 98.4 °F (36.9 °C)  Heart Rate:  [75-99] 94  Resp:  [18-20] 18  BP: (126-156)/(77-89) 150/89    MENTAL STATUS EXAM:  Appearance:Casually dressed, good hygeine.   Cooperation:Cooperative  Psychomotor: No psychomotor agitation/retardation, No EPS, No motor tics  Speech-normal rate, amount.  Mood \"a bit depressed\"   Affect-congruent, appropriate, stable  Thought Content-goal directed, no delusional material present  Thought process-linear, organized.  Suicidality: No SI  Homicidality: No HI  Perception: No AH/VH  Insight-fair   Judgement-fair    Lab Results (last 24 hours)     ** No results found for the last 24 hours. **             Imaging Results (Last 24 Hours)     ** No results found for the last 24 hours. **             ECG/EMG Results (most recent)     Procedure Component Value Units Date/Time    ECG 12 Lead [[id number]] Collected:  03/10/20 1943     Updated:  20    Narrative:       Test Reason : Baseline Cardiac Status  Blood Pressure : **/** mmHG  Vent. Rate : 061 BPM     Atrial Rate : 061 BPM     P-R Int : 148 ms          QRS Dur : 096 ms      QT Int : 402 ms       P-R-T Axes : 064 030 061 degrees     QTc Int : 404 ms    Normal sinus rhythm  Normal ECG  No previous ECGs available  Confirmed by Uriel Steward () on 3/12/2020 8:33:14 AM    Referred By:  LAITH           Confirmed By:Uriel Steward           ALLERGIES: Patient has no known " allergies.      Current Facility-Administered Medications:   •  acetaminophen (TYLENOL) tablet 650 mg, 650 mg, Oral, Q6H PRN, Shonna Wood MD  •  aluminum-magnesium hydroxide-simethicone (MAALOX MAX) 400-400-40 MG/5ML suspension 15 mL, 15 mL, Oral, Q6H PRN, Shonna Wood MD  •  benzonatate (TESSALON) capsule 100 mg, 100 mg, Oral, TID PRN, Shonna Wood MD  •  benztropine (COGENTIN) tablet 2 mg, 2 mg, Oral, Once PRN **OR** benztropine (COGENTIN) injection 1 mg, 1 mg, Intramuscular, Once PRN, Shonna Wood MD  •  famotidine (PEPCID) tablet 20 mg, 20 mg, Oral, BID PRN, Shonna Wood MD  •  hydrOXYzine (ATARAX) tablet 50 mg, 50 mg, Oral, Q6H PRN, Shonna Wood MD, 50 mg at 20 1445  •  ibuprofen (ADVIL,MOTRIN) tablet 400 mg, 400 mg, Oral, Q6H PRN, Shonna Wood MD  •  loperamide (IMODIUM) capsule 2 mg, 2 mg, Oral, Q2H PRN, Shonna Wood MD  •  [COMPLETED] LORazepam (ATIVAN) tablet 2 mg, 2 mg, Oral, 3 times per day, 2 mg at 20 **FOLLOWED BY** [COMPLETED] LORazepam (ATIVAN) tablet 1.5 mg, 1.5 mg, Oral, 3 times per day, 1.5 mg at 20 **FOLLOWED BY** [COMPLETED] LORazepam (ATIVAN) tablet 1 mg, 1 mg, Oral, 3 times per day, 1 mg at 20 **FOLLOWED BY** LORazepam (ATIVAN) tablet 0.5 mg, 0.5 mg, Oral, 3 times per day, Shonna Wood MD, 0.5 mg at 20 1445  •  [] LORazepam (ATIVAN) tablet 2 mg, 2 mg, Oral, Q4H PRN **FOLLOWED BY** [] LORazepam (ATIVAN) tablet 1.5 mg, 1.5 mg, Oral, Q4H PRN **FOLLOWED BY** [] LORazepam (ATIVAN) tablet 1 mg, 1 mg, Oral, Q4H PRN, 1 mg at 20 2105 **FOLLOWED BY** LORazepam (ATIVAN) tablet 0.5 mg, 0.5 mg, Oral, Q4H PRN, Shonna Wood MD  •  magnesium hydroxide (MILK OF MAGNESIA) suspension 2400 mg/10mL 10 mL, 10 mL, Oral, Daily PRN, Shonna Wood MD  •  ondansetron (ZOFRAN) tablet 4 mg, 4 mg, Oral, Q6H PRN, Shonna Wood MD  •  ramipril (ALTACE) capsule 10 mg, 10 mg, Oral, Daily, Shonna Wood MD, 10 mg at 20 0804  •   sertraline (ZOLOFT) tablet 100 mg, 100 mg, Oral, Daily, Shonna Wood MD, 100 mg at 03/14/20 0804  •  sodium chloride nasal spray 2 spray, 2 spray, Each Nare, PRN, Shonna Wood MD  •  traZODone (DESYREL) tablet 50 mg, 50 mg, Oral, Nightly PRN, Shonna Wood MD, 50 mg at 03/13/20 9187    ASSESSMENT & PLAN:      Alcohol use disorder, severe, dependence (CMS/HCC)  - Contnue Ativan detox      Depression  - Continue Zoloft      Anxiety  - Continue Zoloft      Insomnia  - Continue trazodone      Hypertension  - Continue Ramipril    Special precautions: Special Precautions Level 4 (q30 min checks).    Behavioral Health Treatment Plan and Problem List: I have reviewed and approved the Behavioral Health Treatment Plan and Problem list.  The patient has had a chance to review and agrees with the treatment plan.     Clinician:  Shonna Wood MD  03/14/20  17:15

## 2020-03-15 VITALS
OXYGEN SATURATION: 98 % | TEMPERATURE: 97.6 F | WEIGHT: 193.4 LBS | DIASTOLIC BLOOD PRESSURE: 83 MMHG | BODY MASS INDEX: 27.69 KG/M2 | HEIGHT: 70 IN | SYSTOLIC BLOOD PRESSURE: 132 MMHG | HEART RATE: 70 BPM | RESPIRATION RATE: 18 BRPM

## 2020-03-15 PROCEDURE — 99238 HOSP IP/OBS DSCHRG MGMT 30/<: CPT | Performed by: PSYCHIATRY & NEUROLOGY

## 2020-03-15 RX ORDER — SERTRALINE HYDROCHLORIDE 100 MG/1
100 TABLET, FILM COATED ORAL DAILY
Qty: 30 TABLET | Refills: 0 | Status: SHIPPED | OUTPATIENT
Start: 2020-03-16

## 2020-03-15 RX ADMIN — RAMIPRIL 10 MG: 10 CAPSULE ORAL at 08:05

## 2020-03-15 RX ADMIN — HYDROXYZINE HYDROCHLORIDE 50 MG: 50 TABLET ORAL at 08:05

## 2020-03-15 RX ADMIN — SERTRALINE 100 MG: 50 TABLET, FILM COATED ORAL at 08:05

## 2020-03-15 NOTE — DISCHARGE SUMMARY
"PSYCHIATRIC DISCHARGE SUMMARY     Patient Identification:  Name:  Rufino Sim  Age:  49 y.o.  Sex:  male  :  1970  MRN:  [id number]  Visit Number:  [id number]      Date of Admission:3/10/2020   Date of Discharge:  3/15/2020    Discharge Diagnosis:  Active Problems:    Alcohol use disorder, severe, dependence (CMS/HCC)    Depression    Anxiety    Insomnia    Hypertension        Admission Diagnosis:  Chemical dependency (CMS/[facility]) [F19.20]     Hospital Course  Patient is a 49 y.o. male presented with alcohol use and withdrawals.. The patient was admitted to the [facility] detox recovery unit for safety, further evaluation and treatment.  He was treated with Ativan detox and he was able to complete it without any complications.  He reported he was taking risperidone for sleep and it was not really helping him sleep better, and there was no report of symptoms of psychosis, hence risperidone was discontinued, Zoloft dose was titrated up to 100 mg daily. He was continued on ramipril for hypertension and hydroxyzine for anxiety.   The patient was also able to take part in individual and group counseling sessions and work on appropriate coping skills. His original plan was to go to [place] for rehab treatment but he changed his mind and decided to go to [facility] for residential rehab treatment.  The patient made steady improvement in his mood and expressed feeling more positive and hopeful about future. Sleep and appetite were improved.  The day of discharge the patient was calm, cooperative and pleasant. Mood was reported to be good, and denied SI/HI/AVH. Also reported no medication side effects.        Mental Status Exam upon discharge:   Mood \"good\"   Affect-congruent, appropriate, stable  Thought Content-goal directed, no delusional material present  Thought process-linear, organized.  Suicidality: No SI  Homicidality: No HI  Perception: No AH/VH    Procedures Performed         Consults: "   Consults     No orders found from 2/10/2020 to 3/11/2020.          Pertinent Test Results:   Lab Results (last 7 days)     Procedure Component Value Units Date/Time    Scan Slide [[id number]] Collected:  03/10/20 1935    Specimen:  Blood Updated:  03/10/20 2014     Scan Slide --     Comment: See Manual Differential Results       Manual Differential [[id number]]  (Abnormal) Collected:  03/10/20 1935    Specimen:  Blood Updated:  03/10/20 2014     Neutrophil % 52.0 %      Lymphocyte % 36.0 %      Monocyte % 8.0 %      Eosinophil % 4.0 %      Neutrophils Absolute 7.17 10*3/mm3      Lymphocytes Absolute 4.96 10*3/mm3      Monocytes Absolute 1.10 10*3/mm3      Eosinophils Absolute 0.55 10*3/mm3      RBC Morphology Normal     Platelet Morphology Normal    CBC & Differential [[id number]] Collected:  03/10/20 1935    Specimen:  Blood Updated:  03/10/20 2014    Narrative:       The following orders were created for panel order CBC & Differential.  Procedure                               Abnormality         Status                     ---------                               -----------         ------                     CBC Auto Differential[[id number]]        Abnormal            Final result                 Please view results for these tests on the individual orders.    CBC Auto Differential [[id number]]  (Abnormal) Collected:  03/10/20 1935    Specimen:  Blood Updated:  03/10/20 2014     WBC 13.79 10*3/mm3      RBC 4.73 10*6/mm3      Hemoglobin 14.5 g/dL      Hematocrit 45.2 %      MCV 95.6 fL      MCH 30.7 pg      MCHC 32.1 g/dL      RDW 13.2 %      RDW-SD 47.3 fl      MPV 10.9 fL      Platelets 281 10*3/mm3           Condition on Discharge:  improved    Vital Signs  Temp:  [97.5 °F (36.4 °C)-98.7 °F (37.1 °C)] 97.6 °F (36.4 °C)  Heart Rate:  [60-94] 70  Resp:  [18] 18  BP: (108-150)/(67-89) 132/83      Discharge Disposition:  Home or Self Care    Discharge Medications:     Discharge Medications      Changes to Medications       Instructions Start Date   sertraline 100 MG tablet  Commonly known as:  ZOLOFT  What changed:    · medication strength  · how much to take   100 mg, Oral, Daily   Start Date:  March 16, 2020        Continue These Medications      Instructions Start Date   hydrOXYzine pamoate 25 MG capsule  Commonly known as:  VISTARIL   25 mg, Oral, 2 Times Daily PRN      ramipril 10 MG capsule  Commonly known as:  ALTACE   10 mg, Oral, Daily         Stop These Medications    risperiDONE 0.5 MG tablet  Commonly known as:  risperDAL            Discharge Diet: Regular    Activity at Discharge: As tolerated    Follow-up Appointments    [place]    Test Results Pending at Discharge      Clinician:   Shonna Wood MD  03/15/20  11:52

## 2021-03-31 NOTE — PLAN OF CARE
Problem: Patient Care Overview  Goal: Plan of Care Review  Outcome: Ongoing (interventions implemented as appropriate)  Flowsheets (Taken 3/13/2020 2297)  Progress: improving  Plan of Care Reviewed With: patient  Patient Agreement with Plan of Care: agrees      Detail Level: Detailed Plan: Close observation